# Patient Record
Sex: FEMALE | Race: BLACK OR AFRICAN AMERICAN
[De-identification: names, ages, dates, MRNs, and addresses within clinical notes are randomized per-mention and may not be internally consistent; named-entity substitution may affect disease eponyms.]

---

## 2019-04-15 ENCOUNTER — HOSPITAL ENCOUNTER (EMERGENCY)
Dept: HOSPITAL 35 - ER | Age: 39
Discharge: ANOTHER HOSPITAL | End: 2019-04-15
Payer: COMMERCIAL

## 2019-04-15 VITALS — HEIGHT: 62 IN | BODY MASS INDEX: 41.04 KG/M2 | WEIGHT: 223 LBS

## 2019-04-15 VITALS — SYSTOLIC BLOOD PRESSURE: 137 MMHG | DIASTOLIC BLOOD PRESSURE: 81 MMHG

## 2019-04-15 DIAGNOSIS — O21.8: ICD-10-CM

## 2019-04-15 DIAGNOSIS — J45.909: ICD-10-CM

## 2019-04-15 DIAGNOSIS — Z3A.33: ICD-10-CM

## 2019-04-15 DIAGNOSIS — F41.9: ICD-10-CM

## 2019-04-15 DIAGNOSIS — R10.9: ICD-10-CM

## 2019-04-15 DIAGNOSIS — R00.0: ICD-10-CM

## 2019-04-15 DIAGNOSIS — O26.893: Primary | ICD-10-CM

## 2019-04-15 DIAGNOSIS — F32.9: ICD-10-CM

## 2019-04-15 LAB
ALBUMIN SERPL-MCNC: 3 G/DL (ref 3.4–5)
ALT SERPL-CCNC: 29 U/L (ref 30–65)
ANION GAP SERPL CALC-SCNC: 14 MMOL/L (ref 7–16)
AST SERPL-CCNC: 27 U/L (ref 15–37)
BASOPHILS NFR BLD AUTO: 0.5 % (ref 0–2)
BILIRUB SERPL-MCNC: 0.6 MG/DL
BILIRUB UR-MCNC: NEGATIVE MG/DL
BUN SERPL-MCNC: 12 MG/DL (ref 7–18)
CALCIUM SERPL-MCNC: 9.9 MG/DL (ref 8.5–10.1)
CHLORIDE SERPL-SCNC: 104 MMOL/L (ref 98–107)
CO2 SERPL-SCNC: 22 MMOL/L (ref 21–32)
COLOR UR: YELLOW
CREAT SERPL-MCNC: 0.7 MG/DL (ref 0.6–1)
EOSINOPHIL NFR BLD: 0.7 % (ref 0–3)
ERYTHROCYTE [DISTWIDTH] IN BLOOD BY AUTOMATED COUNT: 15.7 % (ref 10.5–14.5)
GLUCOSE SERPL-MCNC: 149 MG/DL (ref 74–106)
GRANULOCYTES NFR BLD MANUAL: 87.6 % (ref 36–66)
HCT VFR BLD CALC: 35.6 % (ref 37–47)
HGB BLD-MCNC: 11.5 GM/DL (ref 12–15)
KETONES UR STRIP-MCNC: (no result) MG/DL
LIPASE: 106 U/L (ref 73–393)
LYMPHOCYTES NFR BLD AUTO: 7.2 % (ref 24–44)
MCH RBC QN AUTO: 26.1 PG (ref 26–34)
MCHC RBC AUTO-ENTMCNC: 32.2 G/DL (ref 28–37)
MCV RBC: 81.2 FL (ref 80–100)
MONOCYTES NFR BLD: 4 % (ref 1–8)
NEUTROPHILS # BLD: 8.3 THOU/UL (ref 1.4–8.2)
PLATELET # BLD: 269 THOU/UL (ref 150–400)
POTASSIUM SERPL-SCNC: 3.8 MMOL/L (ref 3.5–5.1)
PROT SERPL-MCNC: 7.7 G/DL (ref 6.4–8.2)
RBC # BLD AUTO: 4.39 MIL/UL (ref 4.2–5)
RBC # UR STRIP: NEGATIVE /UL
SODIUM SERPL-SCNC: 140 MMOL/L (ref 136–145)
SP GR UR STRIP: >= 1.03 (ref 1–1.03)
URINE CLARITY: CLEAR
URINE GLUCOSE-RANDOM*: NEGATIVE
URINE LEUKOCYTES-REFLEX: NEGATIVE
URINE NITRITE-REFLEX: NEGATIVE
URINE PROTEIN (DIPSTICK): (no result)
URINE REDUCING SUBSTANCE: NEGATIVE %
UROBILINOGEN UR STRIP-ACNC: 0.2 E.U./DL (ref 0.2–1)
WBC # BLD AUTO: 9.5 THOU/UL (ref 4–11)

## 2020-03-20 ENCOUNTER — HOSPITAL ENCOUNTER (EMERGENCY)
Dept: HOSPITAL 35 - ER | Age: 40
Discharge: HOME | End: 2020-03-20
Payer: COMMERCIAL

## 2020-03-20 VITALS — WEIGHT: 210.01 LBS | HEIGHT: 62 IN | BODY MASS INDEX: 38.65 KG/M2

## 2020-03-20 VITALS — DIASTOLIC BLOOD PRESSURE: 70 MMHG | SYSTOLIC BLOOD PRESSURE: 140 MMHG

## 2020-03-20 DIAGNOSIS — F41.9: Primary | ICD-10-CM

## 2020-03-20 DIAGNOSIS — Z79.899: ICD-10-CM

## 2020-03-20 DIAGNOSIS — Z76.0: ICD-10-CM

## 2020-03-20 DIAGNOSIS — F32.9: ICD-10-CM

## 2021-03-27 ENCOUNTER — HOSPITAL ENCOUNTER (INPATIENT)
Dept: HOSPITAL 35 - ER | Age: 41
LOS: 4 days | Discharge: HOME | DRG: 917 | End: 2021-03-31
Attending: INTERNAL MEDICINE | Admitting: INTERNAL MEDICINE
Payer: COMMERCIAL

## 2021-03-27 VITALS — SYSTOLIC BLOOD PRESSURE: 128 MMHG | DIASTOLIC BLOOD PRESSURE: 81 MMHG

## 2021-03-27 VITALS — WEIGHT: 195 LBS | HEIGHT: 62.01 IN | BODY MASS INDEX: 35.43 KG/M2

## 2021-03-27 DIAGNOSIS — F39: ICD-10-CM

## 2021-03-27 DIAGNOSIS — F19.10: ICD-10-CM

## 2021-03-27 DIAGNOSIS — A56.8: ICD-10-CM

## 2021-03-27 DIAGNOSIS — D64.9: ICD-10-CM

## 2021-03-27 DIAGNOSIS — J45.909: ICD-10-CM

## 2021-03-27 DIAGNOSIS — Z87.891: ICD-10-CM
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
## 2021-03-27 DIAGNOSIS — F14.90: ICD-10-CM

## 2021-03-27 DIAGNOSIS — E11.9: ICD-10-CM

## 2021-03-27 DIAGNOSIS — N76.0: ICD-10-CM

## 2021-03-27 DIAGNOSIS — F32.9: ICD-10-CM

## 2021-03-27 DIAGNOSIS — F41.9: ICD-10-CM

## 2021-03-27 DIAGNOSIS — Z79.899: ICD-10-CM

## 2021-03-27 DIAGNOSIS — R74.01: ICD-10-CM

## 2021-03-27 DIAGNOSIS — G93.41: ICD-10-CM

## 2021-03-27 DIAGNOSIS — T40.601A: Primary | ICD-10-CM

## 2021-03-27 DIAGNOSIS — K75.89: ICD-10-CM

## 2021-03-27 DIAGNOSIS — Z23: ICD-10-CM

## 2021-03-27 DIAGNOSIS — Z91.14: ICD-10-CM

## 2021-03-27 LAB
ALBUMIN SERPL-MCNC: 3.8 G/DL (ref 3.4–5)
ALT SERPL-CCNC: 536 U/L (ref 14–59)
AMP/METHAMP: POSITIVE
ANION GAP SERPL CALC-SCNC: 16 MMOL/L (ref 7–16)
AST SERPL-CCNC: 909 U/L (ref 15–37)
BASOPHILS NFR BLD AUTO: 0.3 % (ref 0–2)
BILIRUB DIRECT SERPL-MCNC: < 0.1 MG/DL
BILIRUB SERPL-MCNC: 0.3 MG/DL (ref 0.2–1)
BUN SERPL-MCNC: 18 MG/DL (ref 7–18)
CALCIUM SERPL-MCNC: 8.2 MG/DL (ref 8.5–10.1)
CHLORIDE SERPL-SCNC: 99 MMOL/L (ref 98–107)
CO2 SERPL-SCNC: 21 MMOL/L (ref 21–32)
COCAINE UR-MCNC: POSITIVE NG/ML
CREAT SERPL-MCNC: 1.5 MG/DL (ref 0.6–1)
EOSINOPHIL NFR BLD: 0.1 % (ref 0–3)
ERYTHROCYTE [DISTWIDTH] IN BLOOD BY AUTOMATED COUNT: 15.8 % (ref 10.5–14.5)
GLUCOSE SERPL-MCNC: 231 MG/DL (ref 74–106)
GRANULOCYTES NFR BLD MANUAL: 89.9 % (ref 36–66)
HCT VFR BLD CALC: 37.2 % (ref 37–47)
HGB BLD-MCNC: 11.6 GM/DL (ref 12–15)
LYMPHOCYTES NFR BLD AUTO: 6.5 % (ref 24–44)
MCH RBC QN AUTO: 25.7 PG (ref 26–34)
MCHC RBC AUTO-ENTMCNC: 31.2 G/DL (ref 28–37)
MCV RBC: 82.5 FL (ref 80–100)
MONOCYTES NFR BLD: 3.2 % (ref 1–8)
NEUTROPHILS # BLD: 16.7 THOU/UL (ref 1.4–8.2)
PLATELET # BLD: 288 THOU/UL (ref 150–400)
POTASSIUM SERPL-SCNC: 4.5 MMOL/L (ref 3.5–5.1)
PROT SERPL-MCNC: 8 G/DL (ref 6.4–8.2)
RBC # BLD AUTO: 4.51 MIL/UL (ref 4.2–5)
SALICYLATES SERPL-MCNC: < 2.8 MG/DL (ref 2.8–20)
SODIUM SERPL-SCNC: 136 MMOL/L (ref 136–145)
WBC # BLD AUTO: 18.6 THOU/UL (ref 4–11)

## 2021-03-27 PROCEDURE — 10047: CPT

## 2021-03-27 PROCEDURE — 10081 I&D PILONIDAL CYST COMP: CPT

## 2021-03-27 PROCEDURE — 10045: CPT

## 2021-03-28 VITALS — SYSTOLIC BLOOD PRESSURE: 124 MMHG | DIASTOLIC BLOOD PRESSURE: 68 MMHG

## 2021-03-28 VITALS — DIASTOLIC BLOOD PRESSURE: 67 MMHG | SYSTOLIC BLOOD PRESSURE: 122 MMHG

## 2021-03-28 VITALS — SYSTOLIC BLOOD PRESSURE: 120 MMHG | DIASTOLIC BLOOD PRESSURE: 78 MMHG

## 2021-03-28 VITALS — DIASTOLIC BLOOD PRESSURE: 62 MMHG | SYSTOLIC BLOOD PRESSURE: 111 MMHG

## 2021-03-28 VITALS — DIASTOLIC BLOOD PRESSURE: 69 MMHG | SYSTOLIC BLOOD PRESSURE: 106 MMHG

## 2021-03-28 VITALS — DIASTOLIC BLOOD PRESSURE: 83 MMHG | SYSTOLIC BLOOD PRESSURE: 121 MMHG

## 2021-03-28 LAB
ALBUMIN SERPL-MCNC: 3.5 G/DL (ref 3.4–5)
ALT SERPL-CCNC: 472 U/L (ref 14–59)
APTT BLD: 25.7 SECONDS (ref 24.5–32.8)
AST SERPL-CCNC: 713 U/L (ref 15–37)
BILIRUB DIRECT SERPL-MCNC: 0.1 MG/DL
BILIRUB SERPL-MCNC: 0.3 MG/DL (ref 0.2–1)
BILIRUB UR-MCNC: NEGATIVE MG/DL
COLOR UR: YELLOW
GGTP: 75 U/L (ref 5–55)
INR PPP: 1.1
KETONES UR STRIP-MCNC: NEGATIVE MG/DL
PROT SERPL-MCNC: 7.6 G/DL (ref 6.4–8.2)
PROTHROMBIN TIME: 12.3 SECONDS (ref 9.3–11.4)
RBC # UR STRIP: (no result) /UL
SP GR UR STRIP: 1.02 (ref 1–1.03)
SQUAMOUS: (no result) /LPF (ref 0–3)
URINE CLARITY: CLEAR
URINE GLUCOSE-RANDOM*: NEGATIVE
URINE LEUKOCYTES-REFLEX: (no result)
URINE NITRITE-REFLEX: NEGATIVE
URINE PROTEIN (DIPSTICK): NEGATIVE
URINE WBC-REFLEX: (no result) /HPF (ref 0–5)
UROBILINOGEN UR STRIP-ACNC: 0.2 E.U./DL (ref 0.2–1)

## 2021-03-28 PROCEDURE — 5A0935A ASSISTANCE WITH RESPIRATORY VENTILATION, LESS THAN 24 CONSECUTIVE HOURS, HIGH NASAL FLOW/VELOCITY: ICD-10-PCS | Performed by: INTERNAL MEDICINE

## 2021-03-28 NOTE — EKG
[place] [address] [facility] [employer]
[place]  [address]
Phone:  [phone]                    ELECTROCARDIOGRAM REPORT      
_______________________________________________________________________________
 
Name:       MIXONNING TRONCOSO           Room #:         204-P       ADM IN  
M.R.#:      [id number]     Account #:      [id number]  
Admission:  21    Attend Phys:    Randy Romano MD      
Discharge:              Date of Birth:  [birth year]  
                                                          Report #: [id number]
   [id number]
_______________________________________________________________________________
                         [facility] ED
                                       
Test Date:    2021               Test Time:    00:09:39
Pat Name:     NING MIXON        Department:   
Patient ID:   [id number]            Room:         [facility]
Gender:       F                        Technician:   tyra
:          1980               Requested By: Karla Kaplan
Order Number: [id number] MD:   Mars Kramer
                                 Measurements
Intervals                              Axis          
Rate:         95                       P:            32
SC:           131                      QRS:          44
QRSD:         101                      T:            5
QT:           395                                    
QTc:          497                                    
                           Interpretive Statements
Sinus rhythm
Probable left atrial enlargement
Left ventricular hypertrophy
Borderline prolonged QT interval
Baseline wander in lead(s) V6
No previous ECG available for comparison
Electronically Signed On 3- 11:30:46 CDT by Mars Kramer
[email or web address]
 
 
 
 
 
 
 
 
 
 
 
 
 
 
 
 
 
 
 
  <ELECTRONICALLY SIGNED>
   By: Mars Kramer MD, [facility]    
  21     1130
D: 21 0009                           _____________________________________
T: 219                           Mars Kramer MD, FAC      /EPI

## 2021-03-28 NOTE — NUR
PT ADMITED FROM ER. ADMISSION HX AND ASSESSMENT COMPLETED. VSS. ON CIWA
PROTOCAL. NEW ORDERS NOTED. SR/ST OM TELE. NO CONCERNS AT THIS TIME.

## 2021-03-29 VITALS — SYSTOLIC BLOOD PRESSURE: 154 MMHG | DIASTOLIC BLOOD PRESSURE: 97 MMHG

## 2021-03-29 VITALS — SYSTOLIC BLOOD PRESSURE: 115 MMHG | DIASTOLIC BLOOD PRESSURE: 77 MMHG

## 2021-03-29 VITALS — SYSTOLIC BLOOD PRESSURE: 159 MMHG | DIASTOLIC BLOOD PRESSURE: 77 MMHG

## 2021-03-29 VITALS — SYSTOLIC BLOOD PRESSURE: 94 MMHG | DIASTOLIC BLOOD PRESSURE: 73 MMHG

## 2021-03-29 LAB
ALBUMIN SERPL-MCNC: 2.9 G/DL (ref 3.4–5)
ALT SERPL-CCNC: 336 U/L (ref 14–59)
ANION GAP SERPL CALC-SCNC: 10 MMOL/L (ref 7–16)
AST SERPL-CCNC: 414 U/L (ref 15–37)
BASOPHILS NFR BLD AUTO: 0.7 % (ref 0–2)
BILIRUB SERPL-MCNC: 0.2 MG/DL (ref 0.2–1)
BUN SERPL-MCNC: 8 MG/DL (ref 7–18)
CALCIUM SERPL-MCNC: 8.2 MG/DL (ref 8.5–10.1)
CHLORIDE SERPL-SCNC: 103 MMOL/L (ref 98–107)
CO2 SERPL-SCNC: 27 MMOL/L (ref 21–32)
CREAT SERPL-MCNC: 0.8 MG/DL (ref 0.6–1)
EOSINOPHIL NFR BLD: 3.9 % (ref 0–3)
ERYTHROCYTE [DISTWIDTH] IN BLOOD BY AUTOMATED COUNT: 15.8 % (ref 10.5–14.5)
EST. AVERAGE GLUCOSE BLD GHB EST-MCNC: 120 MG/DL
GLUCOSE SERPL-MCNC: 155 MG/DL (ref 74–106)
GLYCOHEMOGLOBIN (HGB A1C): 5.8 % (ref 4.8–5.6)
GRANULOCYTES NFR BLD MANUAL: 75.3 % (ref 36–66)
HCT VFR BLD CALC: 33.3 % (ref 37–47)
HCV AB SER IA-ACNC: <0.1 (ref 0–0.9)
HGB BLD-MCNC: 10.2 GM/DL (ref 12–15)
LYMPHOCYTES NFR BLD AUTO: 16.3 % (ref 24–44)
MCH RBC QN AUTO: 25 PG (ref 26–34)
MCHC RBC AUTO-ENTMCNC: 30.5 G/DL (ref 28–37)
MCV RBC: 81.9 FL (ref 80–100)
MONOCYTES NFR BLD: 3.8 % (ref 1–8)
NEUTROPHILS # BLD: 9.2 THOU/UL (ref 1.4–8.2)
PLATELET # BLD: 193 THOU/UL (ref 150–400)
POTASSIUM SERPL-SCNC: 3.7 MMOL/L (ref 3.5–5.1)
PROT SERPL-MCNC: 6.5 G/DL (ref 6.4–8.2)
RBC # BLD AUTO: 4.07 MIL/UL (ref 4.2–5)
SODIUM SERPL-SCNC: 140 MMOL/L (ref 136–145)
WBC # BLD AUTO: 12.2 THOU/UL (ref 4–11)

## 2021-03-29 NOTE — NUR
A/O X 4.UP WITH ASSIST TO THE BATHROOM.UNSTEADY GAIT.BED ALARM ON.ATIVAN
GIVEN.SPO2 LOW.PLACED ON O2 2L NC.MONITOR SHOWS SINUS TACHY.POC CONTINUED.

## 2021-03-29 NOTE — NUR
PT ALERT AND ORIENTED. ON CIWA PROTOCAL. PRN ANXIETY AND PAIN MED GIVEN AS
ORDERED. SR/ST ON TELE. NO CONCERNS AT THIS TIME.

## 2021-03-30 VITALS — DIASTOLIC BLOOD PRESSURE: 97 MMHG | SYSTOLIC BLOOD PRESSURE: 148 MMHG

## 2021-03-30 VITALS — SYSTOLIC BLOOD PRESSURE: 142 MMHG | DIASTOLIC BLOOD PRESSURE: 88 MMHG

## 2021-03-30 VITALS — DIASTOLIC BLOOD PRESSURE: 88 MMHG | SYSTOLIC BLOOD PRESSURE: 149 MMHG

## 2021-03-30 VITALS — SYSTOLIC BLOOD PRESSURE: 146 MMHG | DIASTOLIC BLOOD PRESSURE: 91 MMHG

## 2021-03-30 VITALS — SYSTOLIC BLOOD PRESSURE: 131 MMHG | DIASTOLIC BLOOD PRESSURE: 81 MMHG

## 2021-03-30 VITALS — SYSTOLIC BLOOD PRESSURE: 125 MMHG | DIASTOLIC BLOOD PRESSURE: 76 MMHG

## 2021-03-30 LAB
ALBUMIN SERPL-MCNC: 2.7 G/DL (ref 3.4–5)
ALT SERPL-CCNC: 231 U/L (ref 30–65)
ANION GAP SERPL CALC-SCNC: 7 MMOL/L (ref 7–16)
AST SERPL-CCNC: 192 U/L (ref 15–37)
BILIRUB SERPL-MCNC: 0.3 MG/DL (ref 0.2–1)
BUN SERPL-MCNC: 9 MG/DL (ref 7–18)
CALCIUM SERPL-MCNC: 8.2 MG/DL (ref 8.5–10.1)
CHLORIDE SERPL-SCNC: 105 MMOL/L (ref 98–107)
CO2 SERPL-SCNC: 27 MMOL/L (ref 21–32)
CREAT SERPL-MCNC: 0.7 MG/DL (ref 0.6–1)
ERYTHROCYTE [DISTWIDTH] IN BLOOD BY AUTOMATED COUNT: 15.9 % (ref 10.5–14.5)
FERRITIN SERPL-MCNC: 27 NG/ML (ref 8–252)
GLUCOSE SERPL-MCNC: 127 MG/DL (ref 74–106)
HCT VFR BLD CALC: 30.4 % (ref 37–47)
HGB BLD-MCNC: 9.7 GM/DL (ref 12–15)
IRON SERPL-MCNC: 23 UG/DL (ref 50–170)
MAGNESIUM SERPL-MCNC: 1.6 MG/DL (ref 1.8–2.4)
MCH RBC QN AUTO: 25.7 PG (ref 26–34)
MCHC RBC AUTO-ENTMCNC: 31.8 G/DL (ref 28–37)
MCV RBC: 80.7 FL (ref 80–100)
PLATELET # BLD: 189 THOU/UL (ref 150–400)
POTASSIUM SERPL-SCNC: 3.5 MMOL/L (ref 3.5–5.1)
PROT SERPL-MCNC: 6.1 G/DL (ref 6.4–8.2)
RBC # BLD AUTO: 3.77 MIL/UL (ref 4.2–5)
SAO2 % BLD FROM PO2: 9 % (ref 20–39)
SODIUM SERPL-SCNC: 139 MMOL/L (ref 136–145)
TIBC SERPL-MCNC: 256 UG/DL (ref 250–450)
WBC # BLD AUTO: 9.4 THOU/UL (ref 4–11)

## 2021-03-30 NOTE — NUR
Pt transferred from  at 1755; transferred to bed safely. Pt drowsy but
rousable. On MS, not on telemetry; no complains and signs of chest pain,
crushing sensation and heaviness. Assisted in ADLs. On carb controlled diet-
tolerating dinner well; no nausea, no vomiting and no abdominal pain noted. On
blood sugar monitoring- taken and recorded accordingly. A/w stool sample for
occult blood. Continent of bowel and bladder- assisted in going to the toilet
with standby assist. With SL at R FA- intact. Falls bundle in place.
To continue monitoring patient.
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
## 2021-03-30 NOTE — NUR
A/O X 4.UP WITH ASSIST TO THE BATHROOM.IV FLUIDS INFUSING.IBUPROFEN GIVEN FOR
PAIN.MONITOR SHOWS SR.POC CONTINUED.

## 2021-03-31 VITALS — DIASTOLIC BLOOD PRESSURE: 95 MMHG | SYSTOLIC BLOOD PRESSURE: 139 MMHG

## 2021-03-31 VITALS — SYSTOLIC BLOOD PRESSURE: 139 MMHG | DIASTOLIC BLOOD PRESSURE: 95 MMHG

## 2021-03-31 LAB
ALBUMIN SERPL-MCNC: 2.7 G/DL (ref 3.4–5)
ALT SERPL-CCNC: 165 U/L (ref 30–65)
ANION GAP SERPL CALC-SCNC: 5 MMOL/L (ref 7–16)
AST SERPL-CCNC: 104 U/L (ref 15–37)
BILIRUB SERPL-MCNC: 0.5 MG/DL (ref 0.2–1)
BUN SERPL-MCNC: 6 MG/DL (ref 7–18)
CALCIUM SERPL-MCNC: 8.5 MG/DL (ref 8.5–10.1)
CHLORIDE SERPL-SCNC: 104 MMOL/L (ref 98–107)
CO2 SERPL-SCNC: 28 MMOL/L (ref 21–32)
CREAT SERPL-MCNC: 0.7 MG/DL (ref 0.6–1)
GLUCOSE SERPL-MCNC: 132 MG/DL (ref 74–106)
POTASSIUM SERPL-SCNC: 3.8 MMOL/L (ref 3.5–5.1)
PROT SERPL-MCNC: 6.2 G/DL (ref 6.4–8.2)
SODIUM SERPL-SCNC: 137 MMOL/L (ref 136–145)

## 2021-03-31 NOTE — NUR
CARE TEAM INDICATED THAT PT IS MEDICALLY STABLE TO DC HOME THIS DAY. PT
INDICATED PT NEEDED FWW FOR HOME USE UPON DC. CM ORDERED FWW THROUGH [facility]
AND IT WAS DELIVERED TO PT'S ROOM. PT INDICATED SHE NEEDED A CAB VOUCHER HOME
THIS DAY. ON WAS PROVIDED. PT DISCHRGED HOME THIS DAY TO SELF CARE. NO OTHER
CM INTERVENTION INIDCATED. CASE CLOSED.

## 2021-03-31 NOTE — NUR
PT A&OX4, VSS, PAIN LEFT HIP. PREVIOUS IMAGING ON HIP NEGATIVE, PAIN
MEDICATION GIVEN. PATIENT DISCHARGED HOME AND VERBALIZED UNDERSTANDING OF
DISHARGE. PATIENT DISCHARGE HOME. PATIENT DID NOT BRING CLOTHING, HAS CELL
PHONE. IV REMOVED AND NO SIGNS OF DISTRESS. PATIENT GIVEN CAB VOUCHER.

## 2021-04-24 ENCOUNTER — HOSPITAL ENCOUNTER (EMERGENCY)
Dept: HOSPITAL 35 - ER | Age: 41
Discharge: HOME | End: 2021-04-24
Payer: COMMERCIAL

## 2021-04-24 VITALS — HEIGHT: 62 IN | WEIGHT: 189.99 LBS | BODY MASS INDEX: 34.96 KG/M2

## 2021-04-24 VITALS — SYSTOLIC BLOOD PRESSURE: 138 MMHG | DIASTOLIC BLOOD PRESSURE: 84 MMHG

## 2021-04-24 DIAGNOSIS — E11.9: ICD-10-CM

## 2021-04-24 DIAGNOSIS — Z79.899: ICD-10-CM

## 2021-04-24 DIAGNOSIS — R09.81: Primary | ICD-10-CM

## 2021-04-24 DIAGNOSIS — J01.00: ICD-10-CM

## 2021-04-24 DIAGNOSIS — F14.10: ICD-10-CM

## 2021-04-24 DIAGNOSIS — J45.909: ICD-10-CM

## 2021-04-25 ENCOUNTER — HOSPITAL ENCOUNTER (EMERGENCY)
Dept: HOSPITAL 35 - ER | Age: 41
LOS: 1 days | Discharge: HOME | End: 2021-04-26
Payer: COMMERCIAL

## 2021-04-25 VITALS — BODY MASS INDEX: 34.96 KG/M2 | HEIGHT: 62 IN | WEIGHT: 189.99 LBS

## 2021-04-25 DIAGNOSIS — O99.511: Primary | ICD-10-CM

## 2021-04-25 DIAGNOSIS — Z3A.01: ICD-10-CM

## 2021-04-25 DIAGNOSIS — R51.9: ICD-10-CM

## 2021-04-25 DIAGNOSIS — J32.0: ICD-10-CM

## 2021-04-25 DIAGNOSIS — F14.10: ICD-10-CM

## 2021-04-25 DIAGNOSIS — O26.891: ICD-10-CM

## 2021-04-25 DIAGNOSIS — O99.321: ICD-10-CM

## 2021-04-25 DIAGNOSIS — J32.1: ICD-10-CM

## 2021-04-26 VITALS — SYSTOLIC BLOOD PRESSURE: 145 MMHG | DIASTOLIC BLOOD PRESSURE: 92 MMHG

## 2021-04-26 LAB
ALBUMIN SERPL-MCNC: 3.9 G/DL (ref 3.4–5)
ALT SERPL-CCNC: 58 U/L (ref 30–65)
ANION GAP SERPL CALC-SCNC: 16 MMOL/L (ref 7–16)
AST SERPL-CCNC: 38 U/L (ref 15–37)
BASOPHILS NFR BLD AUTO: 0.8 % (ref 0–2)
BILIRUB SERPL-MCNC: 0.6 MG/DL (ref 0.2–1)
BUN SERPL-MCNC: 10 MG/DL (ref 7–18)
CALCIUM SERPL-MCNC: 9.2 MG/DL (ref 8.5–10.1)
CHLORIDE SERPL-SCNC: 104 MMOL/L (ref 98–107)
CO2 SERPL-SCNC: 21 MMOL/L (ref 21–32)
CREAT SERPL-MCNC: 0.8 MG/DL (ref 0.6–1)
EOSINOPHIL NFR BLD: 6.8 % (ref 0–3)
ERYTHROCYTE [DISTWIDTH] IN BLOOD BY AUTOMATED COUNT: 17.3 % (ref 10.5–14.5)
GLUCOSE SERPL-MCNC: 95 MG/DL (ref 74–106)
GRANULOCYTES NFR BLD MANUAL: 72.2 % (ref 36–66)
HCT VFR BLD CALC: 34.9 % (ref 37–47)
HGB BLD-MCNC: 11.2 GM/DL (ref 12–15)
LYMPHOCYTES NFR BLD AUTO: 14.6 % (ref 24–44)
MCH RBC QN AUTO: 26.5 PG (ref 26–34)
MCHC RBC AUTO-ENTMCNC: 32 G/DL (ref 28–37)
MCV RBC: 82.8 FL (ref 80–100)
MONOCYTES NFR BLD: 5.6 % (ref 1–8)
NEUTROPHILS # BLD: 10.2 THOU/UL (ref 1.4–8.2)
PLATELET # BLD: 312 THOU/UL (ref 150–400)
POTASSIUM SERPL-SCNC: 3.5 MMOL/L (ref 3.5–5.1)
PROT SERPL-MCNC: 7.9 G/DL (ref 6.4–8.2)
RBC # BLD AUTO: 4.21 MIL/UL (ref 4.2–5)
SODIUM SERPL-SCNC: 141 MMOL/L (ref 136–145)
WBC # BLD AUTO: 14.2 THOU/UL (ref 4–11)